# Patient Record
Sex: MALE | Race: WHITE | HISPANIC OR LATINO | Employment: FULL TIME
[De-identification: names, ages, dates, MRNs, and addresses within clinical notes are randomized per-mention and may not be internally consistent; named-entity substitution may affect disease eponyms.]

---

## 2024-05-14 ENCOUNTER — TRANSCRIBE ORDERS (OUTPATIENT)
Dept: OTHER | Age: 29
End: 2024-05-14

## 2024-05-14 DIAGNOSIS — R06.00 DYSPNEA: Primary | ICD-10-CM

## 2024-06-03 RX ORDER — ALBUTEROL SULFATE 0.83 MG/ML
2.5 SOLUTION RESPIRATORY (INHALATION) ONCE
Status: COMPLETED | OUTPATIENT
Start: 2024-06-04 | End: 2024-06-04

## 2024-06-03 RX ORDER — ALBUTEROL SULFATE 0.83 MG/ML
2.5 SOLUTION RESPIRATORY (INHALATION) ONCE
Status: DISCONTINUED | OUTPATIENT
Start: 2024-06-03 | End: 2024-06-03

## 2024-06-04 ENCOUNTER — HOSPITAL ENCOUNTER (OUTPATIENT)
Dept: RADIOLOGY | Facility: CLINIC | Age: 29
Discharge: HOME | End: 2024-06-04
Attending: PHYSICAL MEDICINE & REHABILITATION
Payer: OTHER GOVERNMENT

## 2024-06-04 ENCOUNTER — HOSPITAL ENCOUNTER (OUTPATIENT)
Dept: RESPIRATORY THERAPY | Facility: CLINIC | Age: 29
Discharge: HOME | End: 2024-06-04
Attending: PHYSICAL MEDICINE & REHABILITATION
Payer: OTHER GOVERNMENT

## 2024-06-04 DIAGNOSIS — R06.00 DYSPNEA: ICD-10-CM

## 2024-06-04 PROCEDURE — 250N000009 HC RX 250

## 2024-06-04 PROCEDURE — 71046 X-RAY EXAM CHEST 2 VIEWS: CPT

## 2024-06-04 PROCEDURE — 94060 EVALUATION OF WHEEZING: CPT

## 2024-06-04 PROCEDURE — 999N000157 HC STATISTIC RCP TIME EA 10 MIN

## 2024-06-04 RX ADMIN — ALBUTEROL SULFATE 2.5 MG: 2.5 SOLUTION RESPIRATORY (INHALATION) at 12:21

## 2024-06-04 NOTE — PROGRESS NOTES
RESPIRATORY CARE NOTE    Pre and Psot Spirometry with BD Pulmonary Function Test completed by patient.  Good patient effort and cooperation. Albuterol 2.5 mg neb given for bronchodilation.  The results of this test meet the ATS standards for acceptability and repeatability. PT returned to baseline and left in no distress.    Ana M Womack, RT  6/4/2024

## 2024-06-06 LAB
EXPTIME-PRE: 5.81 SEC
FEF2575-%PRED-POST: 109 %
FEF2575-%PRED-PRE: 79 %
FEF2575-POST: 5.05 L/SEC
FEF2575-PRE: 3.66 L/SEC
FEF2575-PRED: 4.6 L/SEC
FEFMAX-%PRED-PRE: 80 %
FEFMAX-PRE: 8.58 L/SEC
FEFMAX-PRED: 10.71 L/SEC
FEV1-%PRED-PRE: 91 %
FEV1-PRE: 4.11 L
FEV1FEV6-PRE: 79 %
FEV1FEV6-PRED: 83 %
FEV1FVC-PRE: 79 %
FEV1FVC-PRED: 84 %
FIFMAX-PRE: 6.65 L/SEC
FVC-%PRED-PRE: 96 %
FVC-PRE: 5.22 L
FVC-PRED: 5.39 L